# Patient Record
Sex: FEMALE | Race: WHITE | NOT HISPANIC OR LATINO | Employment: FULL TIME | URBAN - METROPOLITAN AREA
[De-identification: names, ages, dates, MRNs, and addresses within clinical notes are randomized per-mention and may not be internally consistent; named-entity substitution may affect disease eponyms.]

---

## 2019-04-01 ENCOUNTER — TRANSCRIBE ORDERS (OUTPATIENT)
Dept: ADMINISTRATIVE | Facility: HOSPITAL | Age: 44
End: 2019-04-01

## 2019-04-01 DIAGNOSIS — G57.51 TARSAL TUNNEL SYNDROME OF RIGHT SIDE: Primary | ICD-10-CM

## 2019-07-17 RX ORDER — ATORVASTATIN CALCIUM 40 MG/1
40 TABLET, FILM COATED ORAL DAILY
COMMUNITY

## 2019-07-17 RX ORDER — DIPHENOXYLATE HYDROCHLORIDE AND ATROPINE SULFATE 2.5; .025 MG/1; MG/1
1 TABLET ORAL DAILY
COMMUNITY

## 2019-07-17 RX ORDER — ATENOLOL 50 MG/1
50 TABLET ORAL DAILY
COMMUNITY

## 2019-07-17 RX ORDER — FERROUS SULFATE 325(65) MG
325 TABLET ORAL
COMMUNITY

## 2019-07-17 RX ORDER — MULTIVIT WITH MINERALS/LUTEIN
250 TABLET ORAL DAILY
COMMUNITY

## 2019-07-17 RX ORDER — LORATADINE 10 MG/1
10 TABLET ORAL DAILY
COMMUNITY

## 2019-07-17 RX ORDER — MEDROXYPROGESTERONE ACETATE 10 MG/1
10 TABLET ORAL DAILY
COMMUNITY

## 2019-07-17 RX ORDER — ALBUTEROL SULFATE 90 UG/1
2 AEROSOL, METERED RESPIRATORY (INHALATION) EVERY 6 HOURS PRN
COMMUNITY

## 2019-07-17 RX ORDER — HYDROCHLOROTHIAZIDE 12.5 MG/1
12.5 TABLET ORAL DAILY
COMMUNITY

## 2019-07-17 RX ORDER — GABAPENTIN 300 MG/1
600 CAPSULE ORAL
COMMUNITY

## 2019-07-17 RX ORDER — PHENOL 1.4 %
600 AEROSOL, SPRAY (ML) MUCOUS MEMBRANE 2 TIMES DAILY WITH MEALS
COMMUNITY

## 2019-07-17 RX ORDER — POLYVINYL ALCOHOL 14 MG/ML
1 SOLUTION/ DROPS OPHTHALMIC AS NEEDED
COMMUNITY

## 2019-07-17 RX ORDER — INSULIN GLARGINE 100 [IU]/ML
38 INJECTION, SOLUTION SUBCUTANEOUS
COMMUNITY

## 2019-07-17 NOTE — PRE-PROCEDURE INSTRUCTIONS
Pre-Surgery Instructions:   Medication Instructions    albuterol (PROVENTIL HFA,VENTOLIN HFA) 90 mcg/act inhaler Instructed patient per Anesthesia Guidelines   atenolol (TENORMIN) 50 mg tablet Instructed patient per Anesthesia Guidelines   gabapentin (NEURONTIN) 300 mg capsule Instructed patient per Anesthesia Guidelines   hydrochlorothiazide (HYDRODIURIL) 12 5 mg tablet Instructed patient per Anesthesia Guidelines   loratadine (CLARITIN) 10 mg tablet Instructed patient per Anesthesia Guidelines   polyvinyl alcohol (LIQUIFILM TEARS) 1 4 % ophthalmic solution Instructed patient per Anesthesia Guidelines  Pre-op Showering Instructions for Surgery Patients    Before your operation, you play an important role in decreasing your risk for infection by washing with special antiseptic soap  This is an effective way to reduce bacteria on the skin which may help to prevent infections at the surgical site  Please read the following directions in advance  1  In the week before your operation, purchase a 4 ounce bottle of antiseptic soap containing chlorhexidine gluconate (CHG)  4%  Some brand names include: Aplicare®, Endure, and Hibiclens®  The cost is usually less than $5 00   For your convenience, the [employer] carries the soap   It may also be available at your doctors office or pre-admission testing center, and at most retail pharmacies   If you are allergic or sensitive to soaps containing CHG, please let your doctor know so another antiseptic can be suggested   CHG antiseptic soap is for external use only  2   The day before your operation, follow these instructions carefully to get ready   Please clean linens (sheets) on your bed; you should sleep on clean sheets after your evening shower   Get clean towels and washcloth ready - you need enough for 2 showers   Set aside clean underwear, pajamas, and clothing to wear after the showers     Reminders:   DO NOT use any other soap or body rinse on your skin during or after the antiseptic showers   DO NOT use lotion, powder, deodorant, or perfume/aftershave of any kind on your skin after your antiseptic shower   DO NOT shave any body parts in the 24 hours/day before your operation   DO NOT get the antiseptic soap in your eyes, ears, nose, mouth, or vaginal area    3  You will need to shower the night before AND the morning of your surgery  Shower 1:   The first evening before the operation, take the first shower   First, shampoo your hair with regular shampoo and rinse it completely before you use the antiseptic soap  After washing and rinsing your hair, rinse your body   Next, use a clean washcloth to apply the antiseptic soap and wash your body from the neck down to your toes using ½ bottle of the antiseptic soap   You will use the other ½ bottle for the second shower   Clean the area where your incision will be; lather this area well for about 2 minutes   If you are having head or neck surgery, wash areas with the antiseptic soap   Rinse yourself completely with running water   Use a clean towel to dry off   Wear clean underwear and clothing/pajamas  Shower 2   The morning of your operation, take the second shower following the same steps as Shower 1 using the second ½ of the bottle of antiseptic soap   Use clean cloths and towels to wash and dry yourself   Wear clean underwear and clothing

## 2019-07-22 ENCOUNTER — ANESTHESIA EVENT (OUTPATIENT)
Dept: PERIOP | Facility: HOSPITAL | Age: 44
End: 2019-07-22
Payer: COMMERCIAL

## 2019-07-22 NOTE — ANESTHESIA PREPROCEDURE EVALUATION
Review of Systems/Medical History          Cardiovascular  EKG reviewed, Hyperlipidemia, Hypertension ,    Pulmonary  Negative pulmonary ROS        GI/Hepatic    GERD ,  Hiatal hernia,             Endo/Other  Diabetes well controlled type 2 ,      GYN       Hematology  Anemia iron deficiency anemia,     Musculoskeletal    Arthritis     Neurology  Negative neurology ROS      Psychology   Negative psychology ROS            Echo EF Estimated 60     Result Narrative    Dobutamine Stress Echo Report    PB BEAULIEU    02/18/2016 13:56    Account Number:     [de-identified]      Indications:    chest pain    Technical Notes:      CONCLUSIONS      Negative dobutatmine stress echocardiography for ischemia      Negative dobutamine exercise ECG for ischemia      DOBUTAMINE STRESS PROTOCOL      Duration of Infusion:        9 min      Resting Heart Rate:        87      Peak Dose:    20 µg/kg/min      Peak HR:      154      Atropine:     None      Double Product:      [id number]      Resting BP:     140  /  74      Peak BP:        190  /   78    87       % max pred      Symptoms:     Patient reported shortness of breath and left arm                   heaviness during peak infusion  Symptoms resolved                   during recovery without treatment  Test terminated                   after reaching target heart rate (85% max predicted)      Protocol:     Standard      HEMODYNAMIC RESPONSE    Increasing HR response to dobutamine     Initial decrease in systolic BP during dobutamine infuison with     appropriate response      EKG ANALYSIS      Resting EKG:    NSR with minor resting ST/T wave changes      Exercise EKG:    No ischemic ST/T wave changes with dobutamine      Arrhythmia:    None      Visually Estimated LV Ejection Fraction Pre-Exercise is:                60%      ECHO ANALYSIS AT REST    Normal left ventricular function      All myocardial segments contract normally at rest       ECHO ANALYSIS WITH DOBUTAMINE    Overall left ventricular function improves with dobutamine and chamber     size decreases with dobutamine  All myocardial segments contract normally with dobutamine  DOPPLER MEASUREMENTS    Heart Rate                        87 0 bpm                        Gustavo Abel MD                          Fellow:    (Electronically Signed)    Final Date:      18 February 2016 16:34    Other Result Information   Interface, Rad Results In - 02/18/2016  4:35 PM EST   Dobutamine Stress Echo Report    Barbie Duran    02/18/2016 13:56    Account Number:     [de-identified]      Indications:    chest pain    Technical Notes:      CONCLUSIONS      Negative dobutatmine stress echocardiography for ischemia  Negative dobutamine exercise ECG for ischemia  DOBUTAMINE STRESS PROTOCOL      Duration of Infusion:        9 min      Resting Heart Rate:        87      Peak Dose:    20 µg/kg/min      Peak HR:      154      Atropine:     None      Double Product:      [id number]      Resting BP:     140  /  74      Peak BP:        190  /   78    87       % max pred      Symptoms:     Patient reported shortness of breath and left arm                   heaviness during peak infusion  Symptoms resolved                   during recovery without treatment  Test terminated                   after reaching target heart rate (85% max predicted)      Protocol:     Standard      HEMODYNAMIC RESPONSE    Increasing HR response to dobutamine     Initial decrease in systolic BP during dobutamine infuison with     appropriate response      EKG ANALYSIS      Resting EKG:    NSR with minor resting ST/T wave changes      Exercise EKG:    No ischemic ST/T wave changes with dobutamine      Arrhythmia:    None      Visually Estimated LV Ejection Fraction Pre-Exercise is:                60%      ECHO ANALYSIS AT REST    Normal left ventricular function      All myocardial segments contract normally at rest       ECHO ANALYSIS WITH DOBUTAMINE    Overall left ventricular function improves with dobutamine and chamber     size decreases with dobutamine  All myocardial segments contract normally with dobutamine  DOPPLER MEASUREMENTS    Heart Rate                        87 0 bpm                        Germain Garcia MD                          Fellow:    (Electronically Signed)    Final Date:      18 February 2016 16:34          Physical Exam    Airway    Mallampati score: II  TM Distance: >3 FB  Neck ROM: full     Dental   Comment: Missing many upper  ,     Cardiovascular  Cardiovascular exam normal    Pulmonary  Pulmonary exam normal     Other Findings        Anesthesia Plan  ASA Score- 2     Anesthesia Type- general and regional with ASA Monitors  Additional Monitors:   Airway Plan: LMA  Comment: Right Popliteal Block  Plan Factors-    Induction- intravenous  Postoperative Plan-     Informed Consent- Anesthetic plan and risks discussed with patient  I personally reviewed this patient with the CRNA  Discussed and agreed on the Anesthesia Plan with the CRNA  Jesenia Hoffman

## 2019-07-23 ENCOUNTER — HOSPITAL ENCOUNTER (OUTPATIENT)
Facility: HOSPITAL | Age: 44
Setting detail: OUTPATIENT SURGERY
Discharge: HOME | End: 2019-07-23
Attending: PODIATRIST | Admitting: PODIATRIST
Payer: COMMERCIAL

## 2019-07-23 ENCOUNTER — ANESTHESIA (OUTPATIENT)
Dept: PERIOP | Facility: HOSPITAL | Age: 44
End: 2019-07-23
Payer: COMMERCIAL

## 2019-07-23 VITALS
OXYGEN SATURATION: 96 % | HEIGHT: 63 IN | WEIGHT: 178 LBS | TEMPERATURE: 97.3 F | SYSTOLIC BLOOD PRESSURE: 134 MMHG | DIASTOLIC BLOOD PRESSURE: 74 MMHG | RESPIRATION RATE: 16 BRPM | BODY MASS INDEX: 31.54 KG/M2 | HEART RATE: 95 BPM

## 2019-07-23 DIAGNOSIS — Z98.890 S/P FOOT SURGERY, RIGHT: Primary | ICD-10-CM

## 2019-07-23 LAB
EXT PREGNANCY TEST URINE: NEGATIVE
EXT. CONTROL: NORMAL
GLUCOSE SERPL-MCNC: 115 MG/DL (ref 65–140)
GLUCOSE SERPL-MCNC: 99 MG/DL (ref 65–140)

## 2019-07-23 PROCEDURE — 82948 REAGENT STRIP/BLOOD GLUCOSE: CPT

## 2019-07-23 PROCEDURE — 81025 URINE PREGNANCY TEST: CPT | Performed by: PODIATRIST

## 2019-07-23 RX ORDER — ROPIVACAINE HYDROCHLORIDE 5 MG/ML
INJECTION, SOLUTION EPIDURAL; INFILTRATION; PERINEURAL
Status: COMPLETED | OUTPATIENT
Start: 2019-07-23 | End: 2019-07-23

## 2019-07-23 RX ORDER — FENTANYL CITRATE 50 UG/ML
INJECTION, SOLUTION INTRAMUSCULAR; INTRAVENOUS AS NEEDED
Status: DISCONTINUED | OUTPATIENT
Start: 2019-07-23 | End: 2019-07-23 | Stop reason: SURG

## 2019-07-23 RX ORDER — DEXAMETHASONE SODIUM PHOSPHATE 10 MG/ML
INJECTION, SOLUTION INTRAMUSCULAR; INTRAVENOUS AS NEEDED
Status: DISCONTINUED | OUTPATIENT
Start: 2019-07-23 | End: 2019-07-23 | Stop reason: SURG

## 2019-07-23 RX ORDER — LIDOCAINE HYDROCHLORIDE 10 MG/ML
INJECTION, SOLUTION INFILTRATION; PERINEURAL AS NEEDED
Status: DISCONTINUED | OUTPATIENT
Start: 2019-07-23 | End: 2019-07-23 | Stop reason: SURG

## 2019-07-23 RX ORDER — DIPHENHYDRAMINE HYDROCHLORIDE 50 MG/ML
12.5 INJECTION INTRAMUSCULAR; INTRAVENOUS ONCE AS NEEDED
Status: DISCONTINUED | OUTPATIENT
Start: 2019-07-23 | End: 2019-07-23 | Stop reason: HOSPADM

## 2019-07-23 RX ORDER — SODIUM CHLORIDE, SODIUM LACTATE, POTASSIUM CHLORIDE, CALCIUM CHLORIDE 600; 310; 30; 20 MG/100ML; MG/100ML; MG/100ML; MG/100ML
INJECTION, SOLUTION INTRAVENOUS CONTINUOUS PRN
Status: DISCONTINUED | OUTPATIENT
Start: 2019-07-23 | End: 2019-07-23 | Stop reason: SURG

## 2019-07-23 RX ORDER — FENTANYL CITRATE/PF 50 MCG/ML
25 SYRINGE (ML) INJECTION
Status: DISCONTINUED | OUTPATIENT
Start: 2019-07-23 | End: 2019-07-23 | Stop reason: HOSPADM

## 2019-07-23 RX ORDER — SODIUM CHLORIDE, SODIUM LACTATE, POTASSIUM CHLORIDE, CALCIUM CHLORIDE 600; 310; 30; 20 MG/100ML; MG/100ML; MG/100ML; MG/100ML
125 INJECTION, SOLUTION INTRAVENOUS CONTINUOUS
Status: DISCONTINUED | OUTPATIENT
Start: 2019-07-23 | End: 2019-07-23 | Stop reason: HOSPADM

## 2019-07-23 RX ORDER — BUPIVACAINE HYDROCHLORIDE 5 MG/ML
INJECTION, SOLUTION PERINEURAL AS NEEDED
Status: DISCONTINUED | OUTPATIENT
Start: 2019-07-23 | End: 2019-07-23 | Stop reason: HOSPADM

## 2019-07-23 RX ORDER — OXYCODONE HYDROCHLORIDE AND ACETAMINOPHEN 5; 325 MG/1; MG/1
1 TABLET ORAL EVERY 4 HOURS PRN
Qty: 15 TABLET | Refills: 0 | Status: SHIPPED | OUTPATIENT
Start: 2019-07-23 | End: 2019-08-02

## 2019-07-23 RX ORDER — ONDANSETRON 2 MG/ML
INJECTION INTRAMUSCULAR; INTRAVENOUS AS NEEDED
Status: DISCONTINUED | OUTPATIENT
Start: 2019-07-23 | End: 2019-07-23 | Stop reason: SURG

## 2019-07-23 RX ORDER — HYDROMORPHONE HCL/PF 1 MG/ML
0.5 SYRINGE (ML) INJECTION
Status: DISCONTINUED | OUTPATIENT
Start: 2019-07-23 | End: 2019-07-23 | Stop reason: HOSPADM

## 2019-07-23 RX ORDER — PROPOFOL 10 MG/ML
INJECTION, EMULSION INTRAVENOUS AS NEEDED
Status: DISCONTINUED | OUTPATIENT
Start: 2019-07-23 | End: 2019-07-23 | Stop reason: SURG

## 2019-07-23 RX ORDER — ESMOLOL HYDROCHLORIDE 10 MG/ML
INJECTION INTRAVENOUS AS NEEDED
Status: DISCONTINUED | OUTPATIENT
Start: 2019-07-23 | End: 2019-07-23 | Stop reason: SURG

## 2019-07-23 RX ORDER — OXYCODONE HYDROCHLORIDE AND ACETAMINOPHEN 5; 325 MG/1; MG/1
1 TABLET ORAL EVERY 4 HOURS PRN
Status: DISCONTINUED | OUTPATIENT
Start: 2019-07-23 | End: 2019-07-23 | Stop reason: HOSPADM

## 2019-07-23 RX ORDER — CEFAZOLIN SODIUM 2 G/50ML
2000 SOLUTION INTRAVENOUS ONCE
Status: COMPLETED | OUTPATIENT
Start: 2019-07-23 | End: 2019-07-23

## 2019-07-23 RX ORDER — KETOROLAC TROMETHAMINE 30 MG/ML
INJECTION, SOLUTION INTRAMUSCULAR; INTRAVENOUS AS NEEDED
Status: DISCONTINUED | OUTPATIENT
Start: 2019-07-23 | End: 2019-07-23 | Stop reason: SURG

## 2019-07-23 RX ORDER — MAGNESIUM HYDROXIDE 1200 MG/15ML
LIQUID ORAL AS NEEDED
Status: DISCONTINUED | OUTPATIENT
Start: 2019-07-23 | End: 2019-07-23 | Stop reason: HOSPADM

## 2019-07-23 RX ORDER — MIDAZOLAM HYDROCHLORIDE 1 MG/ML
INJECTION INTRAMUSCULAR; INTRAVENOUS AS NEEDED
Status: DISCONTINUED | OUTPATIENT
Start: 2019-07-23 | End: 2019-07-23 | Stop reason: SURG

## 2019-07-23 RX ADMIN — SODIUM CHLORIDE, SODIUM LACTATE, POTASSIUM CHLORIDE, AND CALCIUM CHLORIDE: .6; .31; .03; .02 INJECTION, SOLUTION INTRAVENOUS at 14:58

## 2019-07-23 RX ADMIN — CEFAZOLIN SODIUM 2000 MG: 2 SOLUTION INTRAVENOUS at 14:20

## 2019-07-23 RX ADMIN — MIDAZOLAM HYDROCHLORIDE 2 MG: 1 INJECTION, SOLUTION INTRAMUSCULAR; INTRAVENOUS at 13:50

## 2019-07-23 RX ADMIN — LIDOCAINE HYDROCHLORIDE 50 MG: 10 INJECTION, SOLUTION INFILTRATION; PERINEURAL at 14:20

## 2019-07-23 RX ADMIN — SODIUM CHLORIDE, SODIUM LACTATE, POTASSIUM CHLORIDE, AND CALCIUM CHLORIDE: .6; .31; .03; .02 INJECTION, SOLUTION INTRAVENOUS at 13:25

## 2019-07-23 RX ADMIN — FENTANYL CITRATE 50 MCG: 50 INJECTION INTRAMUSCULAR; INTRAVENOUS at 14:20

## 2019-07-23 RX ADMIN — ONDANSETRON HYDROCHLORIDE 4 MG: 2 INJECTION, SOLUTION INTRAMUSCULAR; INTRAVENOUS at 15:18

## 2019-07-23 RX ADMIN — FENTANYL CITRATE: 50 INJECTION INTRAMUSCULAR; INTRAVENOUS at 15:51

## 2019-07-23 RX ADMIN — KETOROLAC TROMETHAMINE 30 MG: 30 INJECTION, SOLUTION INTRAMUSCULAR; INTRAVENOUS at 15:18

## 2019-07-23 RX ADMIN — PROPOFOL 200 MG: 10 INJECTION, EMULSION INTRAVENOUS at 14:20

## 2019-07-23 RX ADMIN — FENTANYL CITRATE 50 MCG: 50 INJECTION INTRAMUSCULAR; INTRAVENOUS at 13:50

## 2019-07-23 RX ADMIN — ESMOLOL HYDROCHLORIDE 30 MG: 10 INJECTION, SOLUTION INTRAVENOUS at 14:32

## 2019-07-23 RX ADMIN — DEXAMETHASONE SODIUM PHOSPHATE 4 MG: 10 INJECTION, SOLUTION INTRAMUSCULAR; INTRAVENOUS at 14:24

## 2019-07-23 RX ADMIN — ROPIVACAINE HYDROCHLORIDE 30 ML: 5 INJECTION, SOLUTION EPIDURAL; INFILTRATION; PERINEURAL at 13:55

## 2019-07-23 NOTE — NURSING NOTE
No distress  Crutch training was done for NWB right LE  Patient did very well and feels comfortable using crutches  She ambulated to and from bathroom with use of crutches

## 2019-07-23 NOTE — PERIOPERATIVE NURSING NOTE
oob with walker and nwb on right foot  Voided  ivs out  Wants to go home  Discharged via w/c after discharge instructions and walker training given and verbalized an understanding of same

## 2019-07-23 NOTE — ANESTHESIA PROCEDURE NOTES
Peripheral Block    Start time: 7/23/2019 1:45 PM  Reason for block: at surgeon's request and post-op pain management  Staffing  Anesthesiologist: Elis Scales MD  Preanesthetic Checklist  Completed: patient identified, site marked, surgical consent, pre-op evaluation, timeout performed, IV checked, risks and benefits discussed and monitors and equipment checked  Peripheral Block  Patient position: left lateral decubitus  Prep: ChloraPrep  Patient monitoring: heart rate, cardiac monitor, continuous pulse ox and frequent blood pressure checks  Block type: popliteal  Laterality: right  Injection technique: single-shot  Ultrasound permanent image savedropivacaine (NAROPIN) 0 5 % perineural infiltration, 30 mL  Needle  Needle type: Stimuplex   Needle gauge: 21 G  Needle length: 10 cm  Needle localization: nerve stimulator and ultrasound guidance  Test dose: negative  Assessment  Injection assessment: incremental injection, local visualized surrounding nerve on ultrasound and negative aspiration for heme  Paresthesia pain: none  Heart rate change: no  Slow fractionated injection: no  Post-procedure:  site cleaned

## 2019-07-23 NOTE — OP NOTE
OPERATIVE REPORT  PATIENT NAME: August Duvall    :  1975  MRN: [id number]  Pt Location:  OR ROOM 12    SURGERY DATE: 2019    Surgeon(s) and Role:     * David Walsh DPM - Primary     * Terrance Caro DPM - Assisting    Preop Diagnosis:  Tarsal tunnel syndrome of right side [G57 51]    Post-Op Diagnosis Codes:     * Tarsal tunnel syndrome of right side [G57 51]    Procedure(s) (LRB):  RELEASE TARSAL TUNNEL (Right)    Specimen(s):  * No specimens in log *    Estimated Blood Loss:   Minimal    Drains:  * No LDAs found *    Anesthesia Type:   General    Operative Indications:  Tarsal tunnel syndrome of right side [G57 51]      Operative Findings:  Consistent with diagnosis     Complications:   None    Procedure and Technique:  A popliteal block of the right lower extremity was performed pre holding area by anesthesia team   Under mild sedation the patient was brought into the operating room and transferred operating table in a supine position  General anesthesia was achieved by anesthesia team and a universal time-out orders performed were all parties in agreement of the correct patient, correct procedure and correct site  A pneumatic thigh tourniquet was placed over the right thigh with ample padding  The foot was then prepped and draped in the usual aseptic manner  An Esmarch bandage was utilized to exsanguinate the foot and the pneumatic thigh tourniquet was inflated to 325 mm Hg  Attention was directed to the tarsal tunnel area of the right foot  Using a 15 bladed linear incision was made, dissection was carried down through the subcutaneous tissue and to the level of extensor retinaculum using blunt instrumentation, bleeding vessel was controlled using electrocautery  The retinaculum was identified and resected using sharp instrumentation  Following the neurovascular bundle distally the paul pedis entrance was resected    The neurovascular bundle was visualized and protected during the entire procedure  There was notable attention release from resecting of the extensor retinaculum as well as the opening of the paul pedis  Wound was irrigated with copious amounts of normal saline  Subcutaneous tissue was reapproximated using 4-0 Vicryl and skin was reapproximated using 4-0 Prolene  10 cc of 0 5% Marcaine plain was injected near the incision for postoperative pain control  The pneumatic tourniquet was deflated at approximately 42 minutes and proper hyperemic response was noted to all digits  Wound was dressed with Betadine-soaked Adaptic and dry sterile dressing with Coban  A posterior splint was then applied  The patient tolerated procedure and anesthesia well without immediate complications and transferred to PACU vital signs stable      Dr Daron Hamilton was present for the entire procedure    Patient Disposition:  PACU  and hemodynamically stable    SIGNATURE: Heidi Carpio DPM  DATE: July 23, 2019  TIME: 3:32 PM

## 2019-07-23 NOTE — DISCHARGE INSTRUCTIONS
How to Choose and Use a Walker   WHAT YOU NEED TO KNOW:   How do I choose a walker? Your healthcare provider will help you choose the walker that is right for you  You may need a walker with wheels or hand brakes  Walkers can be adjusted for your height  Some have built-in seats  Walkers may be folded for travel or storage  How do I use the walker? · To stand up:      ¨ Put the walker in front of you and slide forward in the chair  ¨ Grasp the arms of the chair  ¨ Lean slightly forward and push on the arms of the chair to raise yourself  ¨ Grasp the handles and step forward into the walker  ¨ Stand with your walker until you feel balanced and ready to walk  ¨ Move the walker forward about 1 step ahead of you  Make sure it is firmly set on the ground before you step forward  · To sit down:      ¨ Stand with the back of your legs against the chair seat  ¨ Reach back with both hands to  the chair arms  ¨ Slowly sit down and slide back into the chair  What are some safety tips for walkers that I should know? · Take small steps  when you use the walker  Do not move the walker too far in front of you as you walk  · Stand for a few seconds before you start to walk  This will help prevent dizziness  · Look straight ahead when you walk  You may run into or trip over something if you look at your feet  · Wear shoes with rubber soles , such as tennis shoes  Do not wear slippers because they can slide off your feet and cause a fall  Do not wear shoes with leather heels or soles that may slide  · Check the rubber tips and wheels on your walker  Replace them if they are worn down or torn  This helps prevent the walker from sliding or tipping over  · Check the floor to be sure it is safe  The floor must be clean, dry, and well lit  Remove throw rugs to prevent falls  Tape or nail down loose carpet edges  Keep the traffic areas and the floor free of clutter      · Attach a bag or basket to the walker  to carry items you may need  CARE AGREEMENT:   You have the right to help plan your care  Learn about your health condition and how it may be treated  Discuss treatment options with your caregivers to decide what care you want to receive  You always have the right to refuse treatment  The above information is an  only  It is not intended as medical advice for individual conditions or treatments  Talk to your doctor, nurse or pharmacist before following any medical regimen to see if it is safe and effective for you  © 2017 [employer] Information is for End User's use only and may not be sold, redistributed or otherwise used for commercial purposes  All illustrations and images included in CareNotes® are the copyrighted property of A [employer] M , [employer]  or [employer]  Crutch Instructions   WHAT YOU NEED TO KNOW:   Crutches are tools that provide support and balance when you walk  You may need 1 or 2 crutches to help support your body weight  You may need crutches if you had surgery or an injury that affects your ability to walk  DISCHARGE INSTRUCTIONS:   How to use crutches safely:   · Support your weight with your arms and hands  Do not support your weight with your armpits  This could hurt the nerves that are in your underarms  Keep your elbow bent when the crutch is in place under your arm  · Walk slowly and carefully with crutches  Go up and down stairs and ramps slowly, and stop to rest when you feel tired  Get up slowly to a sitting or standing position  This will help prevent dizziness and fainting  Use your crutches only on firm ground  Use caution when you walk on ice or snow  Wet or waxed floors and smooth cement floors can be slippery  Watch out for small rugs or cords  How to walk with crutches:   · Place both crutches under your arms, and place your hands on the hand  of the crutches  Place your crutches slightly in front of you  · The top of the crutches should be about 2 fingers [id number] (about 1½ inches) below your armpits  Place your weight on your hands  The top of the crutches should not press into your armpits  · If you have one leg that is injured, keep it off the floor by bending your knee  · Lift the crutches and move them a step ahead of you  Put the rubber ends of the crutches firmly on the ground  Move the foot that is not injured between the crutches  Place that heel down first     · If you are using your crutches for balance, move your right foot and left crutch forward  Then move your left foot and right crutch forward  Keep walking this way  How to go up stairs with crutches:   · Face the stairs  Put the crutches close to the first step  · Push onto the crutches and put your uninjured leg on the first step  · Put your weight on your uninjured leg that is on the first step  Bring both crutches and the injured leg onto the step at the same time  · When you hold onto a railing with one arm, put both crutches under the other arm  Use the railing to help you go up stairs  How to go down stairs with crutches:   · Stand with the toes of your uninjured leg close to the edge of the step  · Bend the knee of your uninjured leg  Slowly lower both crutches along with the injured leg onto the next step  · Lean on your crutches  Slowly lower your uninjured leg onto the same step  · Place both crutches under one arm while you hold onto the railing with the other arm  How to sit in a chair with crutches:   · Turn and back up to the chair until you feel the edge of it against the back of your legs  Keep your injured leg forward  · Take your crutches out from under your arms  Sit while bending your uninjured knee  How to get up from a chair with crutches:   · Sit on the edge of your chair  · Push up with your hands using the crutches or arms of the chair   Put your weight on your uninjured foot as you get up  · Keep your injured leg bent at the knee and off the floor  Contact your healthcare provider if:   · Your crutches do not fit  · One crutch is longer than the other  · Your crutches break or get lost     · The rubber tips of your crutches are split or loose  · You get blisters or painful calluses on your hands or armpits  · Your armpit is red, sore, or has bumps or pimples  · Your arm muscles get weaker the longer you use the crutches  · You have questions or concerns about your condition or care  Return to the emergency department if:   · You have sudden numbness in a hand or arm  · Your fingers feel cold or have cramping pain  © 2017 [employer] Information is for End User's use only and may not be sold, redistributed or otherwise used for commercial purposes  All illustrations and images included in CareNotes® are the copyrighted property of [employer]  or [employer]  The above information is an  only  It is not intended as medical advice for individual conditions or treatments  Talk to your doctor, nurse or pharmacist before following any medical regimen to see if it is safe and effective for you  EMILIE Pride  Post-Operative Instructions    1  Take your prescribed medication as directed  2  Upon arrival at home, lie down and elevate your surgical foot on 2 pillows  3  Remain quiet, off your feet as much as possible, for the first 24-48 hours  This is when your feet first swell and may become painful  After 48 hours you may begin limited walking following these restrictions:   Nonweightbearing to surgical foot  4  Drink large quantities of water and citrus fruit juice  Consume no alcohol  Continue a well-balanced diet  5  Report any unusual discomfort or fever to this office  6  A limited amount of discomfort and swelling is to be expected   In some cases the skin may take on a bruised appearance  The surgical solution that was applied to your foot prior to the operation is dark in color and the operation site may appear to be oozing when it actually is not  7  A slight amount of blood is to be expected, and is no cause for alarm  Do not remove the dressings  If there is active bleeding and if the bleeding persists, add additional gauze to the bandage, apply direct pressure, elevate your feet and call this office  8  Do not get the dressings wet  As regular bathing may be inconvenient, sponge baths are recommended  9  When anesthesia wears off and if any discomfort should be present, apply an ice pack directly over the operated area for 15 minute intervals for several hours or until the pain leaves  (USE IN EXCESS OF 15 MINUTES COULD CAUSE FROSTBITE)  Do not use hot water bags or electric pads  A convenient icepack can be made by placing ice cubes in a plastic bag and covering this with a towel  10  If necessary, take a mild laxative before retiring  11  Wear your special open shoes anytime you put weight on your foot, even if it is just to walk to the bathroom and back  It will probably be 2 or 3 weeks before you will be permitted to try regular shoes  12  Having performed the operation, we are interested in a prompt recovery  Please cooperate by following the above instructions  13  Please call to confirm your post-op appointment or call with any other questions

## 2019-07-23 NOTE — DISCHARGE SUMMARY
Discharge Summary Outpatient Procedure Podiatry -   Isha Woody 38 y o  female MRN: [id number]  Unit/Bed#: OR POOL Encounter: [id number]    Admission Date: 7/23/2019     Admitting Diagnosis: Tarsal tunnel syndrome of right side [G57 51]    Discharge Diagnosis: same    Procedures Performed: RELEASE TARSAL TUNNEL: 68034 (CPT®)    Complications: none    Condition at Discharge: stable    Discharge instructions/Information to patient and family:   See after visit summary for information provided to patient and family  Provisions for Follow-Up Care/Important appointments:  See after visit summary for information related to follow-up care and any pertinent home health orders  Discharge Medications:  See after visit summary for reconciled discharge medications provided to patient and family

## 2024-09-27 ENCOUNTER — APPOINTMENT (OUTPATIENT)
Dept: URGENT CARE | Facility: MEDICAL CENTER | Age: 49
End: 2024-09-27

## 2025-05-12 ENCOUNTER — PATIENT OUTREACH (OUTPATIENT)
Dept: OTHER | Facility: OTHER | Age: 50
End: 2025-05-12

## 2025-05-12 NOTE — PROGRESS NOTES
This Community Health Resource Nurse (CHRN) encountered client at [facility] during outreach hours. Client reported she has history of DM, HTN and migraines.Doesn't have medications.    She shared her last Hgb A1C 9.5  on January 10.    Client amenable for PCP appointment to establish care. She requests  an early day appointment. She works in afternoon. Appointment request is for between 9:30 AM and 10:45 AM she catches bus around 12:30 PM.  Currently working 7 days/week parttime at [employer].  She has no insurance. will need assistance getting insurance.   This CHRN sent Epic chat to [facility] team requesting PCP appointment. Unable to contact [facility] team. Client needed to leave [employer] to go to work. This CHRN shared contact info. This CHRN remains available for support as needed.

## (undated) DEVICE — COBAN 4 IN STERILE

## (undated) DEVICE — BETHLEHEM UNIVERSAL  MIONR EXT: Brand: CARDINAL HEALTH

## (undated) DEVICE — INTENDED FOR TISSUE SEPARATION, AND OTHER PROCEDURES THAT REQUIRE A SHARP SURGICAL BLADE TO PUNCTURE OR CUT.: Brand: BARD-PARKER SAFETY BLADES SIZE 15, STERILE

## (undated) DEVICE — CUFF TOURNIQUET DISP SZ30

## (undated) DEVICE — NEEDLE BLUNT 18 G X 1 1/2IN

## (undated) DEVICE — STANDARD SURGICAL GOWN, L: Brand: CONVERTORS

## (undated) DEVICE — SCD SEQUENTIAL COMPRESSION COMFORT SLEEVE MEDIUM KNEE LENGTH: Brand: KENDALL SCD

## (undated) DEVICE — STOCKINETTE 2P PREROLLD 6X60

## (undated) DEVICE — STRETCH BANDAGE: Brand: CURITY

## (undated) DEVICE — PENCIL ELECTROSURG E-Z CLEAN -0035H

## (undated) DEVICE — SYRINGE 10ML LL

## (undated) DEVICE — CURITY NON-ADHERENT STRIPS: Brand: CURITY

## (undated) DEVICE — STERILE POLYISOPRENE POWDER-FREE SURGICAL GLOVES: Brand: PROTEXIS

## (undated) DEVICE — SUT VICRYL 4-0 PS-2 18 IN J496G

## (undated) DEVICE — NEEDLE 25G X 1 1/2

## (undated) DEVICE — UNDERGLOVE PROTEXIS  BLUE SZ 7

## (undated) DEVICE — CHLORAPREP HI-LITE 26ML ORANGE

## (undated) DEVICE — ACE WRAP 6 IN STERILE

## (undated) DEVICE — PAD CAST 4 IN COTTON NON STERILE

## (undated) DEVICE — SYRINGE 10ML LL CONTROL TOP

## (undated) DEVICE — SUT PROLENE 4-0 PS-2 18 IN 8682G

## (undated) DEVICE — SPLINT 4 IN X 15 FT DYNACAST S

## (undated) DEVICE — BUCKET PLASTER DISPOSABLE

## (undated) DEVICE — INTENDED FOR TISSUE SEPARATION, AND OTHER PROCEDURES THAT REQUIRE A SHARP SURGICAL BLADE TO PUNCTURE OR CUT.: Brand: BARD-PARKER ® SAFETYLOCK CARBON RIB-BACK BLADES

## (undated) DEVICE — POV-IOD SOLUTION 4OZ BT

## (undated) DEVICE — STERILE POLYISOPRENE POWDER-FREE SURGICAL GLOVES WITH EMOLLIENT COATING: Brand: PROTEXIS

## (undated) DEVICE — WEBRIL 6 IN UNSTERILE